# Patient Record
Sex: MALE
[De-identification: names, ages, dates, MRNs, and addresses within clinical notes are randomized per-mention and may not be internally consistent; named-entity substitution may affect disease eponyms.]

---

## 2020-05-07 PROBLEM — Z00.00 ENCOUNTER FOR PREVENTIVE HEALTH EXAMINATION: Status: ACTIVE | Noted: 2020-05-07

## 2020-05-11 ENCOUNTER — APPOINTMENT (OUTPATIENT)
Dept: UROLOGY | Facility: CLINIC | Age: 35
End: 2020-05-11
Payer: COMMERCIAL

## 2020-05-11 VITALS
HEIGHT: 75 IN | WEIGHT: 187 LBS | BODY MASS INDEX: 23.25 KG/M2 | DIASTOLIC BLOOD PRESSURE: 86 MMHG | SYSTOLIC BLOOD PRESSURE: 122 MMHG | TEMPERATURE: 98.2 F | HEART RATE: 78 BPM

## 2020-05-11 DIAGNOSIS — Z78.9 OTHER SPECIFIED HEALTH STATUS: ICD-10-CM

## 2020-05-11 DIAGNOSIS — M54.5 LOW BACK PAIN: ICD-10-CM

## 2020-05-11 DIAGNOSIS — Z80.0 FAMILY HISTORY OF MALIGNANT NEOPLASM OF DIGESTIVE ORGANS: ICD-10-CM

## 2020-05-11 DIAGNOSIS — G89.29 LOW BACK PAIN: ICD-10-CM

## 2020-05-11 DIAGNOSIS — E03.9 HYPOTHYROIDISM, UNSPECIFIED: ICD-10-CM

## 2020-05-11 LAB
BILIRUB UR QL STRIP: NORMAL
CLARITY UR: CLEAR
COLLECTION METHOD: NORMAL
GLUCOSE UR-MCNC: NORMAL
HCG UR QL: 0.2 EU/DL
HGB UR QL STRIP.AUTO: NORMAL
KETONES UR-MCNC: NORMAL
LEUKOCYTE ESTERASE UR QL STRIP: NORMAL
NITRITE UR QL STRIP: NORMAL
PH UR STRIP: 6.5
PROT UR STRIP-MCNC: NORMAL
SP GR UR STRIP: 1.01

## 2020-05-11 PROCEDURE — 99204 OFFICE O/P NEW MOD 45 MIN: CPT

## 2020-05-11 PROCEDURE — 81003 URINALYSIS AUTO W/O SCOPE: CPT | Mod: QW

## 2020-05-11 RX ORDER — UBIDECARENONE/VIT E ACET 100MG-5
CAPSULE ORAL
Refills: 0 | Status: ACTIVE | COMMUNITY

## 2020-05-11 RX ORDER — MULTIVIT-MIN/IRON/FOLIC ACID/K 18-600-40
CAPSULE ORAL
Refills: 0 | Status: ACTIVE | COMMUNITY

## 2020-05-11 NOTE — ASSESSMENT
[FreeTextEntry1] : I discussed the findings and options with Mr. MARCI LOPEZ in detail.  Would like him to repeat a semen analysis and I have ordered the appropriate hormonal profile and scrotal sonogram.\par \par Once these tests have been completed I would like to see Mr. Daigle again to discuss options.  Basically, if the tests are normal and the only abnormal finding is persistent teratospermia, there are limited pharmacologic options available as Clomiphene citrate would unlikely be beneficial in this setting.  It may however be worth a try before proceeding with assisted fertilization techniques.  I will go over the appropriate recommendations once I have seen all the results.\par

## 2020-05-11 NOTE — HISTORY OF PRESENT ILLNESS
[FreeTextEntry1] : Mr. MARCI LOPEZ comes in today for a urologic evaluation.  He is here primarily for a fertility evaluation.  He has been trying to impregnate his wife of almost two years for the past 8 months, unsuccessfully.  Neither has been involved in any prior pregnancies.  His wife is  27 years old and her evaluation is essentially normal.  A semenalysis confirmed teratospermia (see below)\par \par From his general urologic history, Mr. Lopez reports negligible LUTS.  He awakens once at night to urinate.  \par His erections and ejaculation are normal. \par \par Semenalysis:  4/30/20--2.4ml, 73.5mill/ml, 64% motility; 0.5% normal forms (NL >4%).\par \par

## 2020-05-11 NOTE — PHYSICAL EXAM
[General Appearance - Well Developed] : well developed [General Appearance - Well Nourished] : well nourished [Normal Appearance] : normal appearance [Well Groomed] : well groomed [General Appearance - In No Acute Distress] : no acute distress [Heart Rate And Rhythm] : Heart rate and rhythm were normal [Edema] : no peripheral edema [Respiration, Rhythm And Depth] : normal respiratory rhythm and effort [Exaggerated Use Of Accessory Muscles For Inspiration] : no accessory muscle use [Abdomen Soft] : soft [Abdomen Tenderness] : non-tender [Abdomen Mass (___ Cm)] : no abdominal mass palpated [Abdomen Hernia] : no hernia was discovered [Costovertebral Angle Tenderness] : no ~M costovertebral angle tenderness [Urethral Meatus] : meatus normal [Penis Abnormality] : normal uncircumcised penis [Urinary Bladder Findings] : the bladder was normal on palpation [Scrotum] : the scrotum was normal [Epididymis] : the epididymides were normal [Testes Tenderness] : no tenderness of the testes [Testes Mass (___cm)] : there were no testicular masses [Prostate Enlargement] : the prostate was not enlarged [Prostate Tenderness] : the prostate was not tender [No Prostate Nodules] : no prostate nodules [Normal Station and Gait] : the gait and station were normal for the patient's age [] : no rash [Sensation] : the sensory exam was normal to light touch and pinprick [Oriented To Time, Place, And Person] : oriented to person, place, and time [No Focal Deficits] : no focal deficits [Affect] : the affect was normal [Mood] : the mood was normal [Not Anxious] : not anxious [No Palpable Adenopathy] : no palpable adenopathy [FreeTextEntry1] : Normal secondary male characteristics. No gynecomastia.  25cc firm testes.  No varicoceles.

## 2020-05-11 NOTE — LETTER BODY
[Dear  ___] : Dear  [unfilled], [Please see my note below.] : Please see my note below. [Consult Closing:] : Thank you very much for allowing me to participate in the care of this patient.  If you have any questions, please do not hesitate to contact me. [Sincerely,] : Sincerely, [FreeTextEntry3] : Zhen Monsivais MD, FACS\par

## 2020-05-11 NOTE — PHYSICAL EXAM
[General Appearance - Well Developed] : well developed [General Appearance - Well Nourished] : well nourished [Normal Appearance] : normal appearance [Well Groomed] : well groomed [General Appearance - In No Acute Distress] : no acute distress [Heart Rate And Rhythm] : Heart rate and rhythm were normal [Edema] : no peripheral edema [Respiration, Rhythm And Depth] : normal respiratory rhythm and effort [Exaggerated Use Of Accessory Muscles For Inspiration] : no accessory muscle use [Abdomen Soft] : soft [Abdomen Tenderness] : non-tender [Abdomen Mass (___ Cm)] : no abdominal mass palpated [Abdomen Hernia] : no hernia was discovered [Costovertebral Angle Tenderness] : no ~M costovertebral angle tenderness [Urethral Meatus] : meatus normal [Penis Abnormality] : normal uncircumcised penis [Urinary Bladder Findings] : the bladder was normal on palpation [Scrotum] : the scrotum was normal [Epididymis] : the epididymides were normal [Testes Tenderness] : no tenderness of the testes [Testes Mass (___cm)] : there were no testicular masses [Prostate Enlargement] : the prostate was not enlarged [Prostate Tenderness] : the prostate was not tender [No Prostate Nodules] : no prostate nodules [Normal Station and Gait] : the gait and station were normal for the patient's age [] : no rash [Sensation] : the sensory exam was normal to light touch and pinprick [Oriented To Time, Place, And Person] : oriented to person, place, and time [No Focal Deficits] : no focal deficits [Affect] : the affect was normal [Mood] : the mood was normal [No Palpable Adenopathy] : no palpable adenopathy [Not Anxious] : not anxious [FreeTextEntry1] : Normal secondary male characteristics. No gynecomastia.  25cc firm testes.  No varicoceles.

## 2020-05-13 LAB
ALBUMIN SERPL ELPH-MCNC: 5.2 G/DL
ALP BLD-CCNC: 50 U/L
ALT SERPL-CCNC: 21 U/L
ANION GAP SERPL CALC-SCNC: 15 MMOL/L
AST SERPL-CCNC: 15 U/L
BILIRUB SERPL-MCNC: 0.6 MG/DL
BUN SERPL-MCNC: 15 MG/DL
CALCIUM SERPL-MCNC: 9.9 MG/DL
CHLORIDE SERPL-SCNC: 104 MMOL/L
CO2 SERPL-SCNC: 24 MMOL/L
CREAT SERPL-MCNC: 1.12 MG/DL
ESTRADIOL SERPL-MCNC: 19 PG/ML
FSH SERPL-MCNC: 2.3 IU/L
GLUCOSE SERPL-MCNC: 106 MG/DL
LH SERPL-ACNC: 3 IU/L
POTASSIUM SERPL-SCNC: 4.5 MMOL/L
PROLACTIN SERPL-MCNC: 13.2 NG/ML
PROT SERPL-MCNC: 8.1 G/DL
SODIUM SERPL-SCNC: 143 MMOL/L

## 2020-05-14 LAB
TESTOST BND SERPL-MCNC: 12.4 PG/ML
TESTOST SERPL-MCNC: 712.9 NG/DL

## 2020-06-08 ENCOUNTER — APPOINTMENT (OUTPATIENT)
Dept: UROLOGY | Facility: CLINIC | Age: 35
End: 2020-06-08

## 2020-06-11 ENCOUNTER — APPOINTMENT (OUTPATIENT)
Dept: UROLOGY | Facility: CLINIC | Age: 35
End: 2020-06-11
Payer: COMMERCIAL

## 2020-06-11 VITALS
TEMPERATURE: 97.4 F | DIASTOLIC BLOOD PRESSURE: 81 MMHG | HEART RATE: 94 BPM | SYSTOLIC BLOOD PRESSURE: 147 MMHG | OXYGEN SATURATION: 97 %

## 2020-06-11 DIAGNOSIS — R39.9 UNSPECIFIED SYMPTOMS AND SIGNS INVOLVING THE GENITOURINARY SYSTEM: ICD-10-CM

## 2020-06-11 DIAGNOSIS — R35.1 NOCTURIA: ICD-10-CM

## 2020-06-11 DIAGNOSIS — Z31.41 ENCOUNTER FOR FERTILITY TESTING: ICD-10-CM

## 2020-06-11 DIAGNOSIS — R86.8 OTHER ABNORMAL FINDINGS IN SPECIMENS FROM MALE GENITAL ORGANS: ICD-10-CM

## 2020-06-11 PROCEDURE — 99214 OFFICE O/P EST MOD 30 MIN: CPT

## 2020-06-11 NOTE — ASSESSMENT
[FreeTextEntry1] : I discussed the findings and options with Mr. MARCI LOPEZ in detail.  In view of the normal laboratory and clinical evaluation no further testing is warranted at this time.\par \par I recommended that Mr. Lopez follows up with Dr. Garces  and I have given the patient copies of all the reports.  If the problem persists, he may consider intrauterine insemination although with his semenanalysis this may not be necessary.

## 2020-06-11 NOTE — PHYSICAL EXAM
[Skin Color & Pigmentation] : normal skin color and pigmentation [FreeTextEntry1] : Normal secondary male characteristics. No gynecomastia.  25cc firm testes.  No varicoceles.

## 2020-06-11 NOTE — HISTORY OF PRESENT ILLNESS
[FreeTextEntry1] : Mr. MARCI LOPEZ comes in today for followup of a fertility evaluation. He and his wife of two years have been trying to conceive for the past 8 months, unsuccessfully.  Neither has been involved in any prior pregnancies.  His wife is  27 years old and her evaluation is reportedly normal.  One semenalysis suggested teratospermia with a followup study being normal (as were the remaining fertility tests).\par \par From his general urologic history, Mr. Lopez reports negligible LUTS.  He awakens once at night to urinate.  \par IPSS: 1/35\par \par His erections and ejaculation are normal. \par \par Semenalysis:  4/30/20--2.4ml, 73.5mill/ml, 64% motility; 0.5% normal forms (NL >4%), 6/9/20--3ml, 71mll/ml, 70% motility, 7% normal forms, pH 8.1. \par Hormonal profile:  5/11/20--Normal\par Scrotal sono:  5/24/20--Normal findings\par \par

## 2021-02-16 NOTE — HISTORY OF PRESENT ILLNESS
I called to discuss his pathology result.  No answer.  I will call back again.    Electronically signed by Zaheer Krishnamurthy MD, 02/16/21, 11:10 AM CST.   [FreeTextEntry1] : Mr. MARCI LOPEZ comes in today for a urologic evaluation.  He is here primarily for a fertility evaluation.  He has been trying to impregnate his wife of almost two years for the past 8 months, unsuccessfully.  Neither has been involved in any prior pregnancies.  His wife is  27 years old and her evaluation is essentially normal.  A semenalysis confirmed teratospermia (see below)\par \par From his general urologic history, Mr. Lopez reports negligible LUTS.  He awakens once at night to urinate.  \par His erections and ejaculation are normal. \par \par Semenalysis:  4/30/20--2.4ml, 73.5mill/ml, 64% motility; 0.5% normal forms (NL >4%).\par \par